# Patient Record
Sex: MALE | Race: WHITE | NOT HISPANIC OR LATINO | Employment: UNEMPLOYED | URBAN - NONMETROPOLITAN AREA
[De-identification: names, ages, dates, MRNs, and addresses within clinical notes are randomized per-mention and may not be internally consistent; named-entity substitution may affect disease eponyms.]

---

## 2017-10-12 ENCOUNTER — CONSULT (OUTPATIENT)
Dept: CARDIOLOGY | Facility: CLINIC | Age: 52
End: 2017-10-12

## 2017-10-12 VITALS
DIASTOLIC BLOOD PRESSURE: 86 MMHG | SYSTOLIC BLOOD PRESSURE: 118 MMHG | WEIGHT: 298 LBS | HEIGHT: 77 IN | HEART RATE: 68 BPM | BODY MASS INDEX: 35.19 KG/M2

## 2017-10-12 DIAGNOSIS — R01.1 MURMUR, CARDIAC: ICD-10-CM

## 2017-10-12 DIAGNOSIS — R94.31 ABNORMAL EKG: ICD-10-CM

## 2017-10-12 DIAGNOSIS — R06.02 SHORTNESS OF BREATH: ICD-10-CM

## 2017-10-12 DIAGNOSIS — E88.81 METABOLIC SYNDROME: ICD-10-CM

## 2017-10-12 DIAGNOSIS — R07.89 CHEST DISCOMFORT: Primary | ICD-10-CM

## 2017-10-12 DIAGNOSIS — E78.00 HYPERCHOLESTEREMIA: ICD-10-CM

## 2017-10-12 PROCEDURE — 99244 OFF/OP CNSLTJ NEW/EST MOD 40: CPT | Performed by: INTERNAL MEDICINE

## 2017-10-12 PROCEDURE — 93000 ELECTROCARDIOGRAM COMPLETE: CPT | Performed by: INTERNAL MEDICINE

## 2017-10-12 RX ORDER — DORZOLAMIDE HYDROCHLORIDE AND TIMOLOL MALEATE 20; 5 MG/ML; MG/ML
1 SOLUTION/ DROPS OPHTHALMIC 2 TIMES DAILY
COMMUNITY

## 2017-10-12 NOTE — PROGRESS NOTES
CARDIAC COMPLAINTS  chest pressure/discomfort and dyspnea      Subjective   Brooks Amaya is a 52 y.o. male came in today for his initial cardiac evaluation.  He has no history of hypertension.  He is not sure about diabetes her cholesterol.  He has not been to the doctor for a long time.  He does have family history of premature coronary artery disease.  This summer, he was under a lot of stress at home and at work.  He started noticing a funny feeling in the chest the form of chest discomfort which normally last form 2-3 minutes to almost 30 minutes.  It occurs mostly during the daytime, very badly at night.  The symptom was not associated with any nausea or vomiting.  There is no increased diaphoresis associated with it.  He also has been noticing increasing shortness of breath which occurs mostly on exertion.  He has no history of orthopnea.  He denies having any chronic cough.  He does have some symptoms of sleep apnea.  He wakes up around 3 in the morning and struck her to go to sleep.  He feels little tired when he wakes up.  In the afternoon after lunch if he sits in a recliner, he tends to take a nap for 30 minutes to an hour.  Since the symptoms are getting little bit frequent, he came today to be evaluated.    No past surgical history on file.    Current Outpatient Prescriptions   Medication Sig Dispense Refill   • dorzolamide-timolol (COSOPT) 22.3-6.8 MG/ML ophthalmic solution Administer 1 drop to both eyes 2 (Two) Times a Day.       No current facility-administered medications for this visit.            ALLERGIES:  Ciprofloxacin and Levaquin [levofloxacin in d5w]    Past Medical History:   Diagnosis Date   • Glaucoma     Had surgey on his left eye   • History of hernia surgery    • Hx of cholecystectomy    • Hx of knee surgery        History   Smoking Status   • Never Smoker   Smokeless Tobacco   • Never Used          Family History   Problem Relation Age of Onset   • Lung cancer Mother    • Heart  "attack Father    • Heart disease Father    • Cancer Maternal Grandmother        Review of Systems   Constitution: Negative for decreased appetite and malaise/fatigue.   HENT: Negative for congestion and sore throat.    Eyes: Negative for blurred vision.   Cardiovascular: Positive for chest pain and dyspnea on exertion. Negative for palpitations.   Respiratory: Positive for shortness of breath. Negative for snoring.    Endocrine: Negative for cold intolerance and heat intolerance.   Hematologic/Lymphatic: Negative for adenopathy. Does not bruise/bleed easily.   Skin: Negative for itching, nail changes and skin cancer.   Musculoskeletal: Negative for arthritis and myalgias.   Gastrointestinal: Negative for abdominal pain, dysphagia and heartburn.   Genitourinary: Negative for bladder incontinence and frequency.   Neurological: Negative for dizziness, light-headedness, seizures and vertigo.   Psychiatric/Behavioral: Negative for altered mental status.   Allergic/Immunologic: Negative for environmental allergies and hives.       Diabetes- No  Thyroid- normal    Objective     /86 (BP Location: Right arm)  Pulse 68  Ht 77\" (195.6 cm)  Wt 298 lb (135 kg)  BMI 35.34 kg/m2    Physical Exam   Constitutional: He is oriented to person, place, and time. He appears well-nourished.   HENT:   Head: Normocephalic.   Eyes: Pupils are equal, round, and reactive to light.   Neck: Normal range of motion.   Cardiovascular: Normal rate, regular rhythm, S1 normal and S2 normal.    Murmur heard.  Pulmonary/Chest: Effort normal and breath sounds normal.   Abdominal: Soft. Bowel sounds are normal.   Musculoskeletal: Normal range of motion.   Neurological: He is alert and oriented to person, place, and time.   Skin: Skin is warm.   Psychiatric: He has a normal mood and affect.         ECG 12 Lead  Date/Time: 10/12/2017 12:00 PM  Performed by: JANNA DAVIS  Authorized by: JANNA DAVIS   Previous ECG: no previous ECG " available  Rhythm: sinus rhythm  Rate: normal  Conduction: LAFB  QRS axis: left  Clinical impression: abnormal ECG              Assessment/Plan     Brooks was seen today for establish care and shortness of breath.    Diagnoses and all orders for this visit:    Chest discomfort  -     High Sensitivity CRP; Future  -     Treadmill Stress Test; Future    Shortness of breath  -     CBC & Differential; Future  -     Comprehensive Metabolic Panel; Future    Metabolic syndrome  -     Adult Transthoracic Echo Complete W/ Cont if Necessary Per Protocol; Future    Murmur, cardiac  -     Adult Transthoracic Echo Complete W/ Cont if Necessary Per Protocol; Future    Hypercholesteremia  -     Lipid Panel; Future    Abnormal EKG  -     Treadmill Stress Test; Future       At baseline his heart rate and blood pressure appears stable, his EKG showed sinus rhythm with left anterior fascicular block.  His BMI is around 35.  I had a very long talk with him about his weight.  I explained to him about the increased risk of developing hypertension, diabetes, and also possible sleep apnea.  I advised him to do some lab work to check his CBC, electrolytes, CRP, lipids.  He also need to undergo a stress test to evaluate for ischemia and also for blood pressure response.  He also need an echocardiogram to evaluate his LV systolic function, valvular structures and the PA pressure.  He is willing to do the lab work now, but he wants to wait for the stress test for at least 4 weeks.  He is going to try to change his eating habit and see whether he'll be able to lose enough weight in the next 4 weeks.  Based on the results of these tests, further recommendations will be made.             Electronically signed by Ivone Dobbs MD October 12, 2017 11:53 AM

## 2017-11-09 ENCOUNTER — APPOINTMENT (OUTPATIENT)
Dept: CARDIOLOGY | Facility: HOSPITAL | Age: 52
End: 2017-11-09
Attending: INTERNAL MEDICINE

## 2018-01-15 ENCOUNTER — HOSPITAL ENCOUNTER (OUTPATIENT)
Dept: CARDIOLOGY | Facility: HOSPITAL | Age: 53
Discharge: HOME | End: 2018-01-15
Attending: INTERNAL MEDICINE

## 2018-01-15 ENCOUNTER — APPOINTMENT (OUTPATIENT)
Dept: CARDIOLOGY | Facility: HOSPITAL | Age: 53
End: 2018-01-15
Attending: INTERNAL MEDICINE